# Patient Record
Sex: MALE | Race: BLACK OR AFRICAN AMERICAN | NOT HISPANIC OR LATINO | Employment: OTHER | URBAN - METROPOLITAN AREA
[De-identification: names, ages, dates, MRNs, and addresses within clinical notes are randomized per-mention and may not be internally consistent; named-entity substitution may affect disease eponyms.]

---

## 2017-03-09 ENCOUNTER — TELEPHONE (OUTPATIENT)
Dept: NEUROLOGY | Facility: CLINIC | Age: 47
End: 2017-03-09

## 2017-09-20 ENCOUNTER — PATIENT MESSAGE (OUTPATIENT)
Dept: NEUROLOGY | Facility: CLINIC | Age: 47
End: 2017-09-20

## 2017-09-20 ENCOUNTER — PATIENT MESSAGE (OUTPATIENT)
Dept: OTOLARYNGOLOGY | Facility: CLINIC | Age: 47
End: 2017-09-20

## 2018-11-28 ENCOUNTER — HOSPITAL ENCOUNTER (EMERGENCY)
Facility: HOSPITAL | Age: 48
Discharge: HOME | End: 2018-11-28
Attending: EMERGENCY MEDICINE
Payer: MEDICARE

## 2018-11-28 VITALS
DIASTOLIC BLOOD PRESSURE: 93 MMHG | OXYGEN SATURATION: 98 % | HEART RATE: 78 BPM | HEIGHT: 69 IN | BODY MASS INDEX: 39.55 KG/M2 | WEIGHT: 267 LBS | TEMPERATURE: 98 F | RESPIRATION RATE: 18 BRPM | SYSTOLIC BLOOD PRESSURE: 133 MMHG

## 2018-11-28 DIAGNOSIS — M10.9 ACUTE GOUT, UNSPECIFIED CAUSE, UNSPECIFIED SITE: Primary | ICD-10-CM

## 2018-11-28 DIAGNOSIS — I10 HYPERTENSION, UNSPECIFIED TYPE: ICD-10-CM

## 2018-11-28 DIAGNOSIS — R09.81 SINUS CONGESTION: ICD-10-CM

## 2018-11-28 LAB
ALBUMIN SERPL-MCNC: 3.7 G/DL (ref 3.3–5.5)
ALP SERPL-CCNC: 99 U/L (ref 42–141)
BILIRUB SERPL-MCNC: 1 MG/DL (ref 0.2–1.6)
BUN SERPL-MCNC: 7 MG/DL (ref 7–22)
CALCIUM SERPL-MCNC: 9.5 MG/DL (ref 8–10.3)
CHLORIDE SERPL-SCNC: 102 MMOL/L (ref 98–108)
CREAT SERPL-MCNC: 0.9 MG/DL (ref 0.6–1.2)
GLUCOSE SERPL-MCNC: 162 MG/DL (ref 73–118)
POC ALT (SGPT): 46 U/L (ref 10–47)
POC AST (SGOT): 29 U/L (ref 11–38)
POC TCO2: 28 MMOL/L (ref 18–33)
POTASSIUM BLD-SCNC: 3.8 MMOL/L (ref 3.6–5.1)
PROTEIN, POC: 7.6 G/DL (ref 6.4–8.1)
SODIUM BLD-SCNC: 144 MMOL/L (ref 128–145)

## 2018-11-28 PROCEDURE — 63600175 PHARM REV CODE 636 W HCPCS: Performed by: NURSE PRACTITIONER

## 2018-11-28 PROCEDURE — 99284 EMERGENCY DEPT VISIT MOD MDM: CPT | Mod: 25

## 2018-11-28 PROCEDURE — 96372 THER/PROPH/DIAG INJ SC/IM: CPT

## 2018-11-28 PROCEDURE — 80053 COMPREHEN METABOLIC PANEL: CPT

## 2018-11-28 PROCEDURE — 25000003 PHARM REV CODE 250: Performed by: NURSE PRACTITIONER

## 2018-11-28 PROCEDURE — 85025 COMPLETE CBC W/AUTO DIFF WBC: CPT

## 2018-11-28 RX ORDER — AMLODIPINE BESYLATE 5 MG/1
5 TABLET ORAL DAILY
Qty: 30 TABLET | Refills: 0 | Status: SHIPPED | OUTPATIENT
Start: 2018-11-28

## 2018-11-28 RX ORDER — CETIRIZINE HYDROCHLORIDE 10 MG/1
10 TABLET ORAL DAILY PRN
Qty: 30 TABLET | Refills: 0 | Status: SHIPPED | OUTPATIENT
Start: 2018-11-28 | End: 2019-11-28

## 2018-11-28 RX ORDER — INDOMETHACIN 25 MG/1
25 CAPSULE ORAL 3 TIMES DAILY PRN
Qty: 15 CAPSULE | Refills: 0 | OUTPATIENT
Start: 2018-11-28 | End: 2021-03-05

## 2018-11-28 RX ORDER — CLONIDINE HYDROCHLORIDE 0.1 MG/1
0.2 TABLET ORAL ONCE
Status: COMPLETED | OUTPATIENT
Start: 2018-11-28 | End: 2018-11-28

## 2018-11-28 RX ORDER — DEXAMETHASONE SODIUM PHOSPHATE 4 MG/ML
12 INJECTION, SOLUTION INTRA-ARTICULAR; INTRALESIONAL; INTRAMUSCULAR; INTRAVENOUS; SOFT TISSUE
Status: COMPLETED | OUTPATIENT
Start: 2018-11-28 | End: 2018-11-28

## 2018-11-28 RX ORDER — ATORVASTATIN CALCIUM 40 MG/1
40 TABLET, FILM COATED ORAL NIGHTLY
Qty: 30 TABLET | Refills: 0 | Status: SHIPPED | OUTPATIENT
Start: 2018-11-28

## 2018-11-28 RX ORDER — HYDROCHLOROTHIAZIDE 25 MG/1
25 TABLET ORAL DAILY
Qty: 30 TABLET | Refills: 0 | Status: SHIPPED | OUTPATIENT
Start: 2018-11-28

## 2018-11-28 RX ADMIN — CLONIDINE HYDROCHLORIDE 0.2 MG: 0.1 TABLET ORAL at 11:11

## 2018-11-28 RX ADMIN — DEXAMETHASONE SODIUM PHOSPHATE 12 MG: 4 INJECTION, SOLUTION INTRAMUSCULAR; INTRAVENOUS at 10:11

## 2018-11-28 NOTE — ED PROVIDER NOTES
Encounter Date: 11/28/2018       History     Chief Complaint   Patient presents with    Joint Pain     woke up with swelling to the rt. hand, went down and came back up swollen,   Denies injuries.      Facial Pain     sinus draining for a couple of weeks.   did not attempt to get any otc medication for sinus.      A 48-year-old male who presents to the ED with right hand swelling for 3 days.  Patient states the pain and swelling started after eating gumbo for Thanksgiving.  Patient has a history of gout.  Patient also report of facial pain and sinus congestion.  Patient has a history of hypertension, hyperlipidemia.  /116.  Patient denies dizziness, blurred vision, or headache. Patient has been out of blood pressure medicine for few days.  Patient states he has been evicted from his house and was unable to get his medications from the house.  He denies taking medication for right hand pain.      The history is provided by the patient.   Joint Pain   This is a recurrent problem. The current episode started more than 2 days ago. The problem has been gradually improving. Pertinent negatives include no chest pain, no abdominal pain and no shortness of breath. The symptoms are aggravated by bending and twisting. Nothing relieves the symptoms. He has tried nothing for the symptoms.   Facial Pain   Pertinent negatives include no chest pain, no abdominal pain and no shortness of breath.     Review of patient's allergies indicates:  No Known Allergies  Past Medical History:   Diagnosis Date    Gout     Hypercholesteremia     Hypertension     Sleep apnea      Past Surgical History:   Procedure Laterality Date    CARPAL TUNNEL RELEASE      COLONOSCOPY N/A 6/4/2014    Performed by ZOË Thurman MD at [facility] END[facility] (4TH FLR)    KNEE ARTHRODESIS      lt. knee     left knee repair      2004     History reviewed. No pertinent family history.  Social History     Tobacco Use    Smoking status: Never Smoker     Smokeless tobacco: Never Used   Substance Use Topics    Alcohol use: Yes     Comment: occasional     Drug use: No     Review of Systems   Constitutional: Negative.  Negative for activity change.   HENT: Positive for congestion.    Eyes: Negative.  Negative for discharge.   Respiratory: Negative.  Negative for shortness of breath.    Cardiovascular: Negative.  Negative for chest pain.   Gastrointestinal: Negative.  Negative for abdominal pain, nausea and vomiting.   Genitourinary: Negative.  Negative for dysuria.   Musculoskeletal:        Right hand swelling   Skin: Negative.    Neurological: Negative.  Negative for weakness.   Hematological: Does not bruise/bleed easily.   Psychiatric/Behavioral: Negative.    All other systems reviewed and are negative.      Physical Exam     Initial Vitals [11/28/18 0951]   BP Pulse Resp Temp SpO2   (!) 261/121 78 18 98 °F (36.7 °C) 98 %      MAP       --         Physical Exam    Nursing note and vitals reviewed.  Constitutional: Vital signs are normal. He appears well-developed.   HENT:   Right Ear: External ear normal.   Left Ear: External ear normal.   Nose: Nose normal.   Mouth/Throat: Oropharynx is clear and moist.   Eyes: Conjunctivae and lids are normal.   Neck: Normal range of motion. Neck supple.   Cardiovascular: Normal rate, regular rhythm, S1 normal, S2 normal and normal heart sounds.   Pulmonary/Chest: Effort normal and breath sounds normal.   Abdominal: Soft. Normal appearance and bowel sounds are normal. There is no tenderness.   Musculoskeletal: Normal range of motion.        Right hand: He exhibits tenderness and swelling. He exhibits normal range of motion and normal capillary refill. Normal sensation noted. Normal strength noted.        Hands:  Neurological: He is alert and oriented to person, place, and time.   Skin: Skin is warm, dry and intact.   Psychiatric: He has a normal mood and affect. His speech is normal.         ED Course   Procedures  Labs Reviewed    POCT CBC   POCT CMP          Imaging Results    None          Medical Decision Making:   Initial Assessment:   A 48-year-old male who presents to the ED with right hand swelling for 3 days.  Patient states the pain and swelling started after eating gumbo for Thanksgiving.  Patient has a history of gout.  Patient also report of facial pain and sinus congestion.  Patient has a history of hypertension, hyperlipidemia.  /116.  Patient denies dizziness, blurred vision, or headache. Patient has been out of blood pressure medicine for few days.  He denies taking medication for right hand pain.    Differential Diagnosis:   Gout exacerbation, Hypertension with noncompliance, Sinus congestion, Acute sinusitis  Clinical Tests:   Lab Tests: Ordered and Reviewed       <> Summary of Lab: CMP- sodium 144, potassium 3.8, BUN 7, creatinine 0.9  CBC- WBC 7.2, hemoglobin 14.9, hematocrit 44.3, platelet 173  ED Management:  Physical exam.  Decadron 12 mg IM.  Medicated with clonidine 0.2 mg p.o.. Repeat B/P 133/93.  Discharge home with Indomethacin, Norvasc, Hydrochlorothiazide and Zyrtec.  Follow-up with PCP in 1-2 days.  Return ED for worsening of symptoms.                      Clinical Impression:   The primary encounter diagnosis was Acute gout, unspecified cause, unspecified site. Diagnoses of Hypertension, unspecified type and Sinus congestion were also pertinent to this visit.                             LETTY Mireles  11/28/18 4228

## 2018-12-10 ENCOUNTER — PATIENT MESSAGE (OUTPATIENT)
Dept: OTOLARYNGOLOGY | Facility: CLINIC | Age: 48
End: 2018-12-10

## 2019-04-22 ENCOUNTER — HOSPITAL ENCOUNTER (EMERGENCY)
Facility: HOSPITAL | Age: 49
Discharge: HOME | End: 2019-04-23
Attending: EMERGENCY MEDICINE
Payer: MEDICARE

## 2019-04-22 DIAGNOSIS — M79.641 PAIN OF RIGHT HAND: Primary | ICD-10-CM

## 2019-04-22 DIAGNOSIS — T14.8XXA MUSCLE STRAIN: ICD-10-CM

## 2019-04-22 PROCEDURE — 99284 EMERGENCY DEPT VISIT MOD MDM: CPT | Mod: 25

## 2019-04-22 PROCEDURE — 99284 PR EMERGENCY DEPT VISIT,LEVEL IV: ICD-10-PCS | Mod: ,,, | Performed by: EMERGENCY MEDICINE

## 2019-04-22 PROCEDURE — 96372 THER/PROPH/DIAG INJ SC/IM: CPT

## 2019-04-22 PROCEDURE — 99284 EMERGENCY DEPT VISIT MOD MDM: CPT | Mod: ,,, | Performed by: EMERGENCY MEDICINE

## 2019-04-23 VITALS
RESPIRATION RATE: 18 BRPM | OXYGEN SATURATION: 97 % | DIASTOLIC BLOOD PRESSURE: 96 MMHG | TEMPERATURE: 99 F | WEIGHT: 270 LBS | BODY MASS INDEX: 39.99 KG/M2 | HEIGHT: 69 IN | HEART RATE: 80 BPM | SYSTOLIC BLOOD PRESSURE: 188 MMHG

## 2019-04-23 PROCEDURE — 63600175 PHARM REV CODE 636 W HCPCS: Performed by: EMERGENCY MEDICINE

## 2019-04-23 PROCEDURE — 96372 THER/PROPH/DIAG INJ SC/IM: CPT

## 2019-04-23 RX ORDER — KETOROLAC TROMETHAMINE 30 MG/ML
15 INJECTION, SOLUTION INTRAMUSCULAR; INTRAVENOUS
Status: COMPLETED | OUTPATIENT
Start: 2019-04-23 | End: 2019-04-23

## 2019-04-23 RX ADMIN — KETOROLAC TROMETHAMINE 15 MG: 30 INJECTION, SOLUTION INTRAMUSCULAR at 12:04

## 2019-04-23 NOTE — ED PROVIDER NOTES
Encounter Date: 4/22/2019    SCRIBE #1 NOTE: I, Rosa Greene, am scribing for, and in the presence of,  Dr. Stuart. I have scribed the entire note.       History     Chief Complaint   Patient presents with    Hand Pain     C/o R hand pain and L shoulder pain. Pt reports hx gout     Time patient was seen by the provider: 12:14 AM      The patient is a 48 y.o. male with co-morbidities including: HTN, gout, hypercholesteremia, and sleep apnea, who presents to the ED with a complaint of right hand pain and swelling. Patient reports a history of gout and states his hand pain feels similar. He endorse taking indomethacin for his gout in the past with improvement. He denies recent injury and states his pain is worse with movement. Patient also complains of left shoulder pain since yesterday. He reports picking up a speaker and states he thinks he pulled a muscle. He denies taking anything for his pain prior to arrival.     The history is provided by the patient.     Review of patient's allergies indicates:  No Known Allergies  Past Medical History:   Diagnosis Date    Gout     Hypercholesteremia     Hypertension     Sleep apnea      Past Surgical History:   Procedure Laterality Date    CARPAL TUNNEL RELEASE      COLONOSCOPY N/A 6/4/2014    Performed by ZOË Thurman MD at [facility] END[facility] (4TH FLR)    KNEE ARTHRODESIS      lt. knee     left knee repair      2004     No family history on file.  Social History     Tobacco Use    Smoking status: Never Smoker    Smokeless tobacco: Never Used   Substance Use Topics    Alcohol use: Yes     Comment: occasional     Drug use: No     Review of Systems  General: No fever.  No chills.  Eyes: No visual changes.  Head: No headache.    Integument: No rashes or lesions.  Chest: No shortness of breath.  Cardiovascular: No chest pain.  Abdomen: No abdominal pain.  No nausea or vomiting.  Urinary: No abnormal urination.  Neurologic: No focal weakness.  No numbness.  Hematologic:  No easy bruising.  Endocrine: No excessive thirst or urination.  Musculoskeletal: Positive for right hand pain and swelling. Positive for left shoulder pain.   Physical Exam     Initial Vitals [04/22/19 2312]   BP Pulse Resp Temp SpO2   (!) 194/110 81 18 98.5 °F (36.9 °C) 97 %      MAP       --         Physical Exam    Nursing note and vitals reviewed.    Appearance: No acute distress.  Skin: No rashes seen.  Good turgor.  No abrasions.  No ecchymoses.  Eyes: No conjunctival injection.  ENT: Oropharynx clear.    Chest: Clear to auscultation bilaterally.  Good air movement.  No wheezes.  No rhonchi.  Cardiovascular: Regular rate and rhythm.  No murmurs. No gallops. No rubs.  Musculoskeletal: Good range of motion all joints. Neck supple.  No meningismus. Tenderness and firmness over left trapezius. Mild swelling and tenderness over second and third MCP joints of right hand. Not hot to touch. Mild pain with movement of these two MCP joints.   Neurologic: Equal strength in upper and lower extremities bilaterally.  Normal sensation.  No facial droop.  Normal speech.    Mental Status:  Alert and oriented x 3.  Appropriate, conversant.      ED Course   Procedures  Labs Reviewed - No data to display       Imaging Results    None          Medical Decision Making:   History:   Old Medical Records: I decided to obtain old medical records.  Initial Assessment:   Patient here with hand and shoulder pain. I believe shoulder strain is musculoskeletal given his history of heavy lifting and physical exam findings. Right hand could be gout although in slightly atypical presentation. Patient given toradol IM in the ED which should help whether it is gout of musculoskeletal in nature. Patient advised to continue NSAIDs at home. Patient stable for discharge. Advised pt to follow up with PCP or return if concerning symptoms arise. Pt understands and agrees with plan. Will d/c home.            Scribe Attestation:   Scribe #1: I performed  the above scribed service and the documentation accurately describes the services I performed. I attest to the accuracy of the note.               Clinical Impression:       ICD-10-CM ICD-9-CM   1. Pain of right hand M79.641 729.5   2. Muscle strain T14.8XXA 848.9         Disposition:   Disposition: Discharged  Condition: Stable                        Supa Stuart MD  04/23/19 2147

## 2019-04-23 NOTE — ED TRIAGE NOTES
Rolf Ashwin Guevara., an 48 y.o. male presents to the ED with a gout flare up to his right hand.  Patient states that he ate seafood yesterday and he thinks that must have exacerbated it.  Redness and swelling noted to dorsum of right hand.        Review of patient's allergies indicates:  No Known Allergies  Chief Complaint   Patient presents with    Hand Pain     C/o R hand pain and L shoulder pain. Pt reports hx gout     Past Medical History:   Diagnosis Date    Gout     Hypercholesteremia     Hypertension     Sleep apnea

## 2021-03-05 ENCOUNTER — HOSPITAL ENCOUNTER (EMERGENCY)
Facility: HOSPITAL | Age: 51
Discharge: HOME | End: 2021-03-05
Attending: EMERGENCY MEDICINE
Payer: MEDICARE

## 2021-03-05 VITALS
TEMPERATURE: 99 F | RESPIRATION RATE: 18 BRPM | HEART RATE: 95 BPM | DIASTOLIC BLOOD PRESSURE: 100 MMHG | WEIGHT: 270 LBS | SYSTOLIC BLOOD PRESSURE: 180 MMHG | HEIGHT: 69 IN | BODY MASS INDEX: 39.99 KG/M2 | OXYGEN SATURATION: 97 %

## 2021-03-05 DIAGNOSIS — M77.9 TENDONITIS: Primary | ICD-10-CM

## 2021-03-05 DIAGNOSIS — H61.20 CERUMEN IN AUDITORY CANAL ON EXAMINATION: ICD-10-CM

## 2021-03-05 PROCEDURE — 99283 EMERGENCY DEPT VISIT LOW MDM: CPT

## 2021-03-05 PROCEDURE — 25000003 PHARM REV CODE 250: Performed by: NURSE PRACTITIONER

## 2021-03-05 PROCEDURE — 99284 EMERGENCY DEPT VISIT MOD MDM: CPT | Mod: ,,, | Performed by: NURSE PRACTITIONER

## 2021-03-05 PROCEDURE — 99284 PR EMERGENCY DEPT VISIT,LEVEL IV: ICD-10-PCS | Mod: ,,, | Performed by: NURSE PRACTITIONER

## 2021-03-05 RX ORDER — IBUPROFEN 800 MG/1
800 TABLET ORAL EVERY 6 HOURS PRN
Qty: 20 TABLET | Refills: 0 | Status: SHIPPED | OUTPATIENT
Start: 2021-03-05

## 2021-03-05 RX ORDER — AMLODIPINE BESYLATE 5 MG/1
5 TABLET ORAL
Status: COMPLETED | OUTPATIENT
Start: 2021-03-05 | End: 2021-03-05

## 2021-03-05 RX ORDER — IBUPROFEN 400 MG/1
800 TABLET ORAL
Status: COMPLETED | OUTPATIENT
Start: 2021-03-05 | End: 2021-03-05

## 2021-03-05 RX ADMIN — IBUPROFEN 800 MG: 400 TABLET, FILM COATED ORAL at 11:03

## 2021-03-05 RX ADMIN — AMLODIPINE BESYLATE 5 MG: 5 TABLET ORAL at 11:03

## 2021-04-16 ENCOUNTER — PATIENT MESSAGE (OUTPATIENT)
Dept: RESEARCH | Facility: HOSPITAL | Age: 51
End: 2021-04-16

## 2022-06-07 ENCOUNTER — PATIENT MESSAGE (OUTPATIENT)
Dept: ADMINISTRATIVE | Facility: OTHER | Age: 52
End: 2022-06-07
Payer: MEDICAID

## 2022-06-12 ENCOUNTER — PATIENT MESSAGE (OUTPATIENT)
Dept: ADMINISTRATIVE | Facility: OTHER | Age: 52
End: 2022-06-12
Payer: MEDICAID

## 2024-03-03 ENCOUNTER — HOSPITAL ENCOUNTER (EMERGENCY)
Facility: OTHER | Age: 54
Discharge: HOME | End: 2024-03-03
Attending: EMERGENCY MEDICINE
Payer: MEDICARE

## 2024-03-03 VITALS
RESPIRATION RATE: 16 BRPM | SYSTOLIC BLOOD PRESSURE: 139 MMHG | TEMPERATURE: 98 F | HEART RATE: 85 BPM | OXYGEN SATURATION: 97 % | DIASTOLIC BLOOD PRESSURE: 93 MMHG

## 2024-03-03 DIAGNOSIS — R59.0 OCCIPITAL LYMPHADENOPATHY: Primary | ICD-10-CM

## 2024-03-03 PROCEDURE — 99282 EMERGENCY DEPT VISIT SF MDM: CPT

## 2024-03-03 RX ORDER — ACETAMINOPHEN 500 MG
500 TABLET ORAL EVERY 6 HOURS PRN
Qty: 20 TABLET | Refills: 0 | Status: SHIPPED | OUTPATIENT
Start: 2024-03-03

## 2024-03-04 NOTE — FIRST PROVIDER EVALUATION
" Emergency Department TeleTriage Encounter Note      CHIEF COMPLAINT    Chief Complaint   Patient presents with    Cyst     Reports neck pain and knot to L neck onset yesterday morning. Knot TTP, no head or drainage noted. Denies fevers.       VITAL SIGNS   Initial Vitals [03/03/24 1825]   BP Pulse Resp Temp SpO2   (!) 143/94 (!) 114 16 98.4 °F (36.9 °C) 96 %      MAP       --            ALLERGIES    Review of patient's allergies indicates:  No Known Allergies    PROVIDER TRIAGE NOTE  Patient presents with "knot" on his neck. No drainage. No fever      ORDERS  Labs Reviewed - No data to display    ED Orders (720h ago, onward)      None              Virtual Visit Note: The provider triage portion of this emergency department evaluation and documentation was performed via [employer], a HIPAA-compliant telemedicine application, in concert with a tele-presenter in the room. A face to face patient evaluation with one of my colleagues will occur once the patient is placed in an emergency department room.      DISCLAIMER: This note was prepared with M*Modal voice recognition transcription software. Garbled syntax, mangled pronouns, and other bizarre constructions may be attributed to that software system.    "

## 2024-03-04 NOTE — ED PROVIDER NOTES
"Encounter Date: 3/3/2024    SCRIBE #1 NOTE: I, Candelaria Arshad, am scribing for, and in the presence of,  Roz Boston MD. I have scribed the following portions of the note - Other sections scribed: HPI, ROS, and physical exam.       History     Chief Complaint   Patient presents with    Cyst     Reports neck pain and knot to L neck onset yesterday morning. Knot TTP, no head or drainage noted. Denies fevers.     Time seen by provider: 8:32 PM    This is a 53 y.o. male with a past medical history of sleep apnea, gout, HTN, and HLD who presents with complaint of "knot" to the base of his head on the right onset yesterday morning. He reports that he just woke up with and the knot was there. He states that the knot feels "hard." He adds that this has not happened before. He denies fever, chills, ear pain, sore throat, and any drainage from the area.    This is the extent of the patient's complaints at this time.        The history is provided by the patient.     Review of patient's allergies indicates:  No Known Allergies  Past Medical History:   Diagnosis Date    Gout     Hypercholesteremia     Hypertension     Sleep apnea      Past Surgical History:   Procedure Laterality Date    CARPAL TUNNEL RELEASE      KNEE ARTHRODESIS      lt. knee     left knee repair      2004     History reviewed. No pertinent family history.  Social History     Tobacco Use    Smoking status: Never    Smokeless tobacco: Never   Substance Use Topics    Alcohol use: Yes     Comment: occasional     Drug use: No     Review of Systems   Constitutional:  Negative for chills, fever and unexpected weight change.   HENT:  Negative for ear pain, nosebleeds and sore throat.    Eyes:  Negative for pain.   Respiratory:  Negative for apnea.    Cardiovascular:  Negative for palpitations.   Gastrointestinal:  Negative for constipation.   Genitourinary:  Negative for enuresis.   Skin:  Negative for pallor.        + "Knot" to base of head on the right. "   Hematological:  Does not bruise/bleed easily.   Psychiatric/Behavioral:  Negative for sleep disturbance.        Physical Exam     Initial Vitals [03/03/24 1825]   BP Pulse Resp Temp SpO2   (!) 143/94 (!) 114 16 98.4 °F (36.9 °C) 96 %      MAP       --         Physical Exam    Nursing note and vitals reviewed.  Constitutional: He appears well-developed and well-nourished. He does not have a sickly appearance. No distress.   HENT:   Head: Normocephalic and atraumatic.   Right Ear: Tympanic membrane, external ear and ear canal normal.   Left Ear: Tympanic membrane, external ear and ear canal normal.   Mouth/Throat: Uvula is midline, oropharynx is clear and moist and mucous membranes are normal. No oropharyngeal exudate, posterior oropharyngeal edema or posterior oropharyngeal erythema.   1.5 by 3 cm mobile mass at the base of the head om the right. No overlying erythema or warmth. Skin intact. No anterior cervical lymphadenopathy. No tenderness over mastoid process. No proptosis.   Eyes: Conjunctivae, EOM and lids are normal. Right eye exhibits no discharge. Left eye exhibits no discharge. Right conjunctiva is not injected. Right conjunctiva has no hemorrhage. Left conjunctiva is not injected. Left conjunctiva has no hemorrhage. No scleral icterus.   Neck: Phonation normal. No stridor present. No tracheal deviation present.   Normal range of motion.  Cardiovascular:  Normal rate, regular rhythm and normal heart sounds.     Exam reveals no friction rub.       No murmur heard.  Pulses:       Radial pulses are 2+ on the right side and 2+ on the left side.        Dorsalis pedis pulses are 2+ on the right side and 2+ on the left side.   Musculoskeletal:      Cervical back: Normal range of motion.     Neurological: He is alert and oriented to person, place, and time. He has normal strength. GCS eye subscore is 4. GCS verbal subscore is 5. GCS motor subscore is 6.   Skin: Skin is warm.   Psychiatric: He has a normal mood  and affect. His speech is normal and behavior is normal. Judgment and thought content normal. Cognition and memory are normal.         ED Course   Procedures  Labs Reviewed - No data to display       Imaging Results    None          Medications - No data to display  Medical Decision Making  53-year-old male presents for evaluation of a knot to the back of his head which he felt yesterday.  He denies any other symptoms.  On exam he does have a palpable mobile mass that is tender to palpation with no overlying skin changes.  The head and neck exam is unremarkable.  There is no evidence of an infection.  Exam more consistent with a lymph node than an abscess.  I did perform a bedside ultrasound and in the area in question there is a well-circumscribed circular hypoechoic area that was noncompressible consistent with urine of a lymph node.  Patient was counseled supportive care for home which included over-the-counter pain medication and warm compresses.  Indications for seeking re-evaluation were discussed with the patient.  He was discharged home in stable condition.    Risk  OTC drugs.            Scribe Attestation:   Scribe #1: I performed the above scribed service and the documentation accurately describes the services I performed. I attest to the accuracy of the note.                         Physician Attestation for Scribe: I, Roz Boston  , reviewed documentation as scribed in my presence, which is both accurate and complete.        Clinical Impression:  Final diagnoses:  [R59.0] Occipital lymphadenopathy (Primary)          ED Disposition Condition    Discharge Stable          ED Prescriptions       Medication Sig Dispense Start Date End Date Auth. Provider    acetaminophen (TYLENOL) 500 MG tablet Take 1 tablet (500 mg total) by mouth every 6 (six) hours as needed for Pain. 20 tablet 3/3/2024 -- Roz Boston MD          Follow-up Information       Follow up With Specialties Details Why Contact Info    primary  care  Schedule an appointment as soon as possible for a visit in 1 week for re-evaluation              Roz Boston MD  03/03/24 2760